# Patient Record
Sex: FEMALE | Race: ASIAN | NOT HISPANIC OR LATINO | URBAN - METROPOLITAN AREA
[De-identification: names, ages, dates, MRNs, and addresses within clinical notes are randomized per-mention and may not be internally consistent; named-entity substitution may affect disease eponyms.]

---

## 2017-09-22 ENCOUNTER — APPOINTMENT (RX ONLY)
Dept: URBAN - METROPOLITAN AREA CLINIC 368 | Facility: CLINIC | Age: 37
Setting detail: DERMATOLOGY
End: 2017-09-22

## 2017-09-22 DIAGNOSIS — D18.0 HEMANGIOMA: ICD-10-CM

## 2017-09-22 DIAGNOSIS — L81.4 OTHER MELANIN HYPERPIGMENTATION: ICD-10-CM

## 2017-09-22 DIAGNOSIS — L82.1 OTHER SEBORRHEIC KERATOSIS: ICD-10-CM

## 2017-09-22 DIAGNOSIS — D22 MELANOCYTIC NEVI: ICD-10-CM

## 2017-09-22 DIAGNOSIS — Z41.9 ENCOUNTER FOR PROCEDURE FOR PURPOSES OTHER THAN REMEDYING HEALTH STATE, UNSPECIFIED: ICD-10-CM

## 2017-09-22 DIAGNOSIS — Z71.89 OTHER SPECIFIED COUNSELING: ICD-10-CM

## 2017-09-22 PROBLEM — D18.01 HEMANGIOMA OF SKIN AND SUBCUTANEOUS TISSUE: Status: ACTIVE | Noted: 2017-09-22

## 2017-09-22 PROBLEM — L29.8 OTHER PRURITUS: Status: ACTIVE | Noted: 2017-09-22

## 2017-09-22 PROBLEM — J45.909 UNSPECIFIED ASTHMA, UNCOMPLICATED: Status: ACTIVE | Noted: 2017-09-22

## 2017-09-22 PROBLEM — E03.9 HYPOTHYROIDISM, UNSPECIFIED: Status: ACTIVE | Noted: 2017-09-22

## 2017-09-22 PROBLEM — G40.909 EPILEPSY, UNSPECIFIED, NOT INTRACTABLE, WITHOUT STATUS EPILEPTICUS: Status: ACTIVE | Noted: 2017-09-22

## 2017-09-22 PROBLEM — D22.5 MELANOCYTIC NEVI OF TRUNK: Status: ACTIVE | Noted: 2017-09-22

## 2017-09-22 PROCEDURE — 99214 OFFICE O/P EST MOD 30 MIN: CPT

## 2017-09-22 PROCEDURE — ? TREATMENT REGIMEN

## 2017-09-22 PROCEDURE — ? COUNSELING

## 2017-09-22 ASSESSMENT — LOCATION SIMPLE DESCRIPTION DERM
LOCATION SIMPLE: RIGHT CHEEK
LOCATION SIMPLE: ABDOMEN
LOCATION SIMPLE: LEFT CHEEK

## 2017-09-22 ASSESSMENT — LOCATION DETAILED DESCRIPTION DERM
LOCATION DETAILED: EPIGASTRIC SKIN
LOCATION DETAILED: LEFT CENTRAL MALAR CHEEK
LOCATION DETAILED: RIGHT INFERIOR CENTRAL MALAR CHEEK

## 2017-09-22 ASSESSMENT — LOCATION ZONE DERM
LOCATION ZONE: TRUNK
LOCATION ZONE: FACE

## 2017-09-22 NOTE — PROCEDURE: TREATMENT REGIMEN
Initiate Treatment: Wash face with GlySal2-2 cleanser every other day, work up twice a day as tolerated\\nPat skin dry\\Stacia bedtime, apply AlphaRet to entire face ( wait after finished with breastfeeding)
Detail Level: Simple
Notification: Please note that there are new Treatment Regimen plans which have an enhanced patient education handout experience.  The plans are called Treatment Regimen (Acne), Treatment Regimen (Atopic Dermatitis), Treatment Regimen (Rosacea), Treatment Regimen (Sun Protection), Treatment Regimen (Cosmetic Products), and Treatment Regimen (Xerosis).

## 2022-10-21 ENCOUNTER — APPOINTMENT (RX ONLY)
Dept: URBAN - METROPOLITAN AREA CLINIC 337 | Facility: CLINIC | Age: 42
Setting detail: DERMATOLOGY
End: 2022-10-21

## 2022-10-21 DIAGNOSIS — L738 OTHER SPECIFIED DISEASES OF HAIR AND HAIR FOLLICLES: ICD-10-CM | Status: INADEQUATELY CONTROLLED

## 2022-10-21 DIAGNOSIS — D22 MELANOCYTIC NEVI: ICD-10-CM

## 2022-10-21 DIAGNOSIS — L30.9 DERMATITIS, UNSPECIFIED: ICD-10-CM | Status: INADEQUATELY CONTROLLED

## 2022-10-21 DIAGNOSIS — L82.1 OTHER SEBORRHEIC KERATOSIS: ICD-10-CM

## 2022-10-21 DIAGNOSIS — D18.0 HEMANGIOMA: ICD-10-CM

## 2022-10-21 DIAGNOSIS — L73.9 FOLLICULAR DISORDER, UNSPECIFIED: ICD-10-CM | Status: INADEQUATELY CONTROLLED

## 2022-10-21 DIAGNOSIS — L81.4 OTHER MELANIN HYPERPIGMENTATION: ICD-10-CM

## 2022-10-21 DIAGNOSIS — L663 OTHER SPECIFIED DISEASES OF HAIR AND HAIR FOLLICLES: ICD-10-CM | Status: INADEQUATELY CONTROLLED

## 2022-10-21 PROBLEM — D23.61 OTHER BENIGN NEOPLASM OF SKIN OF RIGHT UPPER LIMB, INCLUDING SHOULDER: Status: ACTIVE | Noted: 2022-10-21

## 2022-10-21 PROBLEM — D18.01 HEMANGIOMA OF SKIN AND SUBCUTANEOUS TISSUE: Status: ACTIVE | Noted: 2022-10-21

## 2022-10-21 PROBLEM — L02.02 FURUNCLE OF FACE: Status: ACTIVE | Noted: 2022-10-21

## 2022-10-21 PROBLEM — D22.5 MELANOCYTIC NEVI OF TRUNK: Status: ACTIVE | Noted: 2022-10-21

## 2022-10-21 PROCEDURE — ? TREATMENT REGIMEN

## 2022-10-21 PROCEDURE — 99204 OFFICE O/P NEW MOD 45 MIN: CPT

## 2022-10-21 PROCEDURE — ? FULL BODY SKIN EXAM

## 2022-10-21 PROCEDURE — ? PRESCRIPTION

## 2022-10-21 PROCEDURE — ? COUNSELING

## 2022-10-21 PROCEDURE — ? PRESCRIPTION MEDICATION MANAGEMENT

## 2022-10-21 RX ORDER — TRIAMCINOLONE ACETONIDE 1 MG/G
CREAM TOPICAL BID
Qty: 80 | Refills: 2 | Status: ERX | COMMUNITY
Start: 2022-10-21

## 2022-10-21 RX ORDER — CLINDAMYCIN PHOSPHATE 10 MG/ML
LOTION TOPICAL QD
Qty: 60 | Refills: 3 | Status: ERX | COMMUNITY
Start: 2022-10-21

## 2022-10-21 RX ADMIN — TRIAMCINOLONE ACETONIDE: 1 CREAM TOPICAL at 00:00

## 2022-10-21 RX ADMIN — CLINDAMYCIN PHOSPHATE: 10 LOTION TOPICAL at 00:00

## 2022-10-21 ASSESSMENT — LOCATION SIMPLE DESCRIPTION DERM
LOCATION SIMPLE: LOWER BACK
LOCATION SIMPLE: UPPER BACK
LOCATION SIMPLE: LEFT UPPER BACK
LOCATION SIMPLE: CHIN

## 2022-10-21 ASSESSMENT — SEVERITY ASSESSMENT: SEVERITY: ALMOST CLEAR

## 2022-10-21 ASSESSMENT — LOCATION DETAILED DESCRIPTION DERM
LOCATION DETAILED: INFERIOR THORACIC SPINE
LOCATION DETAILED: RIGHT CHIN
LOCATION DETAILED: SUPERIOR LUMBAR SPINE
LOCATION DETAILED: SUPERIOR THORACIC SPINE
LOCATION DETAILED: LEFT INFERIOR UPPER BACK

## 2022-10-21 ASSESSMENT — PAIN INTENSITY VAS: HOW INTENSE IS YOUR PAIN 0 BEING NO PAIN, 10 BEING THE MOST SEVERE PAIN POSSIBLE?: NO PAIN

## 2022-10-21 ASSESSMENT — LOCATION ZONE DERM
LOCATION ZONE: TRUNK
LOCATION ZONE: FACE

## 2022-10-21 ASSESSMENT — ITCH NUMERIC RATING SCALE: HOW SEVERE IS YOUR ITCHING?: 2

## 2022-10-21 ASSESSMENT — BSA RASH: BSA RASH: 1

## 2022-10-21 NOTE — HPI: FULL BODY SKIN EXAMINATION
What Is The Reason For Today's Visit?: Full Body Skin Examination
What Is The Reason For Today's Visit? (Being Monitored For X): the development of a new lesion
Additional History: Pt here for FBSE with 2 itchy spots of concern. One spot on her right eyelid and one on her mid back. No personal or familial skin cancer history.

## 2022-10-21 NOTE — PROCEDURE: PRESCRIPTION MEDICATION MANAGEMENT
Render In Strict Bullet Format?: No
Initiate Treatment: Apply Clindamycin 1 % lotion to affected areas on chin once or twice daily
Detail Level: Zone
Initiate Treatment: Apply Triamcinolone 0.1 % cream twice daily to the affected areas on body (not face) x 4 weeks then apply as needed for [id number]\\napply moisturizer over top

## 2022-12-09 ENCOUNTER — APPOINTMENT (RX ONLY)
Dept: URBAN - METROPOLITAN AREA CLINIC 337 | Facility: CLINIC | Age: 42
Setting detail: DERMATOLOGY
End: 2022-12-09

## 2022-12-09 DIAGNOSIS — H01.13 ECZEMATOUS DERMATITIS OF EYELID: ICD-10-CM | Status: INADEQUATELY CONTROLLED

## 2022-12-09 PROBLEM — H01.133 ECZEMATOUS DERMATITIS OF RIGHT EYE, UNSPECIFIED EYELID: Status: ACTIVE | Noted: 2022-12-09

## 2022-12-09 PROCEDURE — ? COUNSELING

## 2022-12-09 PROCEDURE — ? PRESCRIPTION MEDICATION MANAGEMENT

## 2022-12-09 PROCEDURE — 99214 OFFICE O/P EST MOD 30 MIN: CPT

## 2022-12-09 PROCEDURE — ? PRESCRIPTION

## 2022-12-09 RX ORDER — DESONIDE 0.5 MG/G
CREAM TOPICAL
Qty: 60 | Refills: 2 | Status: ERX | COMMUNITY
Start: 2022-12-09

## 2022-12-09 RX ADMIN — DESONIDE: 0.5 CREAM TOPICAL at 00:00

## 2022-12-09 ASSESSMENT — SEVERITY ASSESSMENT: SEVERITY: MILD

## 2022-12-09 ASSESSMENT — LOCATION ZONE DERM: LOCATION ZONE: EYELID

## 2022-12-09 ASSESSMENT — LOCATION DETAILED DESCRIPTION DERM: LOCATION DETAILED: RIGHT MEDIAL CANTHUS

## 2022-12-09 ASSESSMENT — LOCATION SIMPLE DESCRIPTION DERM: LOCATION SIMPLE: RIGHT EYELID

## 2022-12-09 ASSESSMENT — PAIN INTENSITY VAS: HOW INTENSE IS YOUR PAIN 0 BEING NO PAIN, 10 BEING THE MOST SEVERE PAIN POSSIBLE?: NO PAIN

## 2022-12-09 NOTE — PROCEDURE: PRESCRIPTION MEDICATION MANAGEMENT
Initiate Treatment: Apply Democide to affected area twice daily x 4 weeks\\nApply Vaseline over top
Render In Strict Bullet Format?: No
Detail Level: Zone

## 2023-03-02 ENCOUNTER — APPOINTMENT (RX ONLY)
Dept: URBAN - METROPOLITAN AREA CLINIC 337 | Facility: CLINIC | Age: 43
Setting detail: DERMATOLOGY
End: 2023-03-02

## 2023-03-02 DIAGNOSIS — D17 BENIGN LIPOMATOUS NEOPLASM: ICD-10-CM

## 2023-03-02 DIAGNOSIS — H01.13 ECZEMATOUS DERMATITIS OF EYELID: ICD-10-CM | Status: INADEQUATELY CONTROLLED

## 2023-03-02 PROBLEM — H01.131 ECZEMATOUS DERMATITIS OF RIGHT UPPER EYELID: Status: ACTIVE | Noted: 2023-03-02

## 2023-03-02 PROBLEM — H01.134 ECZEMATOUS DERMATITIS OF LEFT UPPER EYELID: Status: ACTIVE | Noted: 2023-03-02

## 2023-03-02 PROBLEM — D48.5 NEOPLASM OF UNCERTAIN BEHAVIOR OF SKIN: Status: ACTIVE | Noted: 2023-03-02

## 2023-03-02 PROCEDURE — ? ORDER ULTRASOUND

## 2023-03-02 PROCEDURE — ? PRESCRIPTION

## 2023-03-02 PROCEDURE — 99214 OFFICE O/P EST MOD 30 MIN: CPT

## 2023-03-02 PROCEDURE — ? PRESCRIPTION MEDICATION MANAGEMENT

## 2023-03-02 PROCEDURE — ? COUNSELING

## 2023-03-02 RX ORDER — NA MG FL/NA PHO/NACL/HA/NA HYP
GEL (GRAM) TOPICAL
Qty: 40 | Refills: 2 | Status: ERX | COMMUNITY
Start: 2023-03-02

## 2023-03-02 RX ORDER — PIMECROLIMUS 10 MG/G
CREAM TOPICAL
Qty: 30 | Refills: 2 | Status: ERX | COMMUNITY
Start: 2023-03-02

## 2023-03-02 RX ADMIN — Medication: at 00:00

## 2023-03-02 RX ADMIN — PIMECROLIMUS: 10 CREAM TOPICAL at 00:00

## 2023-03-02 ASSESSMENT — LOCATION DETAILED DESCRIPTION DERM
LOCATION DETAILED: LEFT MEDIAL BREAST 7-8:00 REGION
LOCATION DETAILED: RIGHT MEDIAL SUPERIOR EYELID
LOCATION DETAILED: LEFT MEDIAL SUPERIOR EYELID

## 2023-03-02 ASSESSMENT — LOCATION SIMPLE DESCRIPTION DERM
LOCATION SIMPLE: LEFT BREAST
LOCATION SIMPLE: RIGHT SUPERIOR EYELID
LOCATION SIMPLE: LEFT SUPERIOR EYELID

## 2023-03-02 ASSESSMENT — LOCATION ZONE DERM
LOCATION ZONE: TRUNK
LOCATION ZONE: EYELID

## 2023-03-02 NOTE — PROCEDURE: ORDER ULTRASOUND
Priority: normal
Which Breast: Left Breast
Ultrasound Protocol: Ultrasound of Breast
Detail Level: Zone
Subcutaneous Lesion Ultrasound Reason: subcutaneous mobile nodule r/o ossification vs calcification vs other
Provider: Marvel Yanez PA-C
Breast Ultrasound Reason: r/o cyst on left breast 7:00/8:00 region
Time Frame Unit: day(s)

## 2023-03-02 NOTE — PROCEDURE: PRESCRIPTION MEDICATION MANAGEMENT
Initiate Treatment: .\\n1) Start Acuicyn eyelid wash twice daily x 4 weeks\\n2) Apply pimecrolimus to eyelids twice daily x 4 weeks
Render In Strict Bullet Format?: No
Detail Level: Zone
Discontinue Regimen: .\\nDesonide

## 2023-04-13 ENCOUNTER — APPOINTMENT (RX ONLY)
Dept: URBAN - METROPOLITAN AREA CLINIC 337 | Facility: CLINIC | Age: 43
Setting detail: DERMATOLOGY
End: 2023-04-13

## 2023-04-13 DIAGNOSIS — H01.13 ECZEMATOUS DERMATITIS OF EYELID: ICD-10-CM

## 2023-04-13 DIAGNOSIS — L30.9 DERMATITIS, UNSPECIFIED: ICD-10-CM | Status: INADEQUATELY CONTROLLED

## 2023-04-13 PROBLEM — H01.131 ECZEMATOUS DERMATITIS OF RIGHT UPPER EYELID: Status: ACTIVE | Noted: 2023-04-13

## 2023-04-13 PROBLEM — H01.134 ECZEMATOUS DERMATITIS OF LEFT UPPER EYELID: Status: ACTIVE | Noted: 2023-04-13

## 2023-04-13 PROCEDURE — ? PRESCRIPTION MEDICATION MANAGEMENT

## 2023-04-13 PROCEDURE — ? COUNSELING

## 2023-04-13 PROCEDURE — ? PRESCRIPTION

## 2023-04-13 PROCEDURE — 99213 OFFICE O/P EST LOW 20 MIN: CPT

## 2023-04-13 RX ORDER — DEXAMETHASONE 1 MG/ML
SUSPENSION/ DROPS OPHTHALMIC
Qty: 5 | Refills: 1 | Status: ERX | COMMUNITY
Start: 2023-04-13

## 2023-04-13 RX ADMIN — DEXAMETHASONE: 1 SUSPENSION/ DROPS OPHTHALMIC at 00:00

## 2023-04-13 ASSESSMENT — LOCATION DETAILED DESCRIPTION DERM
LOCATION DETAILED: RIGHT MEDIAL SUPERIOR EYELID
LOCATION DETAILED: HAIR
LOCATION DETAILED: LEFT MEDIAL SUPERIOR EYELID

## 2023-04-13 ASSESSMENT — LOCATION ZONE DERM
LOCATION ZONE: SCALP
LOCATION ZONE: EYELID

## 2023-04-13 ASSESSMENT — LOCATION SIMPLE DESCRIPTION DERM
LOCATION SIMPLE: HAIR
LOCATION SIMPLE: LEFT SUPERIOR EYELID
LOCATION SIMPLE: RIGHT SUPERIOR EYELID

## 2023-04-13 NOTE — PROCEDURE: PRESCRIPTION MEDICATION MANAGEMENT
Continue Regimen: .\\nAcuicyn eyelid wash twice daily
Initiate Treatment: .\\nApply dexamethasone to eyelids twice daily x 2 weeks
Render In Strict Bullet Format?: No
Detail Level: Zone
Discontinue Regimen: .\\nDesonide\\nPimecrolimus
Initiate Treatment: .\\nApply Elidel twice daily to affected areas (Pt has rx at home)

## 2024-12-16 ENCOUNTER — RX ONLY (RX ONLY)
Age: 44
End: 2024-12-16

## 2024-12-16 ENCOUNTER — APPOINTMENT (OUTPATIENT)
Dept: URBAN - METROPOLITAN AREA CLINIC 337 | Facility: CLINIC | Age: 44
Setting detail: DERMATOLOGY
End: 2024-12-16

## 2024-12-16 DIAGNOSIS — L21.8 OTHER SEBORRHEIC DERMATITIS: ICD-10-CM | Status: INADEQUATELY CONTROLLED

## 2024-12-16 DIAGNOSIS — D18.0 HEMANGIOMA: ICD-10-CM

## 2024-12-16 DIAGNOSIS — H01.13 ECZEMATOUS DERMATITIS OF EYELID: ICD-10-CM | Status: INADEQUATELY CONTROLLED

## 2024-12-16 DIAGNOSIS — D22 MELANOCYTIC NEVI: ICD-10-CM

## 2024-12-16 DIAGNOSIS — L81.4 OTHER MELANIN HYPERPIGMENTATION: ICD-10-CM

## 2024-12-16 DIAGNOSIS — L82.1 OTHER SEBORRHEIC KERATOSIS: ICD-10-CM

## 2024-12-16 PROBLEM — H01.134 ECZEMATOUS DERMATITIS OF LEFT UPPER EYELID: Status: ACTIVE | Noted: 2024-12-16

## 2024-12-16 PROBLEM — H01.131 ECZEMATOUS DERMATITIS OF RIGHT UPPER EYELID: Status: ACTIVE | Noted: 2024-12-16

## 2024-12-16 PROBLEM — D22.5 MELANOCYTIC NEVI OF TRUNK: Status: ACTIVE | Noted: 2024-12-16

## 2024-12-16 PROBLEM — D18.01 HEMANGIOMA OF SKIN AND SUBCUTANEOUS TISSUE: Status: ACTIVE | Noted: 2024-12-16

## 2024-12-16 PROCEDURE — ? COUNSELING

## 2024-12-16 PROCEDURE — ? PRESCRIPTION

## 2024-12-16 PROCEDURE — ? FULL BODY SKIN EXAM

## 2024-12-16 PROCEDURE — ? PRESCRIPTION MEDICATION MANAGEMENT

## 2024-12-16 PROCEDURE — 99213 OFFICE O/P EST LOW 20 MIN: CPT

## 2024-12-16 PROCEDURE — ? TREATMENT REGIMEN

## 2024-12-16 RX ORDER — FLUOCINOLONE ACETONIDE 0.1 MG/ML
SOLUTION TOPICAL
Qty: 60 | Refills: 2 | Status: ERX | COMMUNITY
Start: 2024-12-16

## 2024-12-16 RX ORDER — DEXAMETHASONE 1 MG/ML
SUSPENSION/ DROPS OPHTHALMIC
Qty: 5 | Refills: 1 | Status: ERX | COMMUNITY
Start: 2024-12-16

## 2024-12-16 RX ORDER — DEXAMETHASONE 1 MG/ML
SUSPENSION/ DROPS OPHTHALMIC
Qty: 5 | Refills: 1 | Status: CANCELLED
Stop reason: SDUPTHER

## 2024-12-16 RX ORDER — KETOCONAZOLE 20 MG/ML
SHAMPOO, SUSPENSION TOPICAL
Qty: 120 | Refills: 3 | Status: ERX | COMMUNITY
Start: 2024-12-16

## 2024-12-16 RX ADMIN — KETOCONAZOLE: 20 SHAMPOO, SUSPENSION TOPICAL at 00:00

## 2024-12-16 RX ADMIN — DEXAMETHASONE: 1 SUSPENSION/ DROPS OPHTHALMIC at 00:00

## 2024-12-16 RX ADMIN — FLUOCINOLONE ACETONIDE: 0.1 SOLUTION TOPICAL at 00:00

## 2024-12-16 ASSESSMENT — LOCATION ZONE DERM
LOCATION ZONE: EYELID
LOCATION ZONE: SCALP
LOCATION ZONE: TRUNK

## 2024-12-16 ASSESSMENT — LOCATION DETAILED DESCRIPTION DERM
LOCATION DETAILED: MID-FRONTAL SCALP
LOCATION DETAILED: SUPERIOR THORACIC SPINE
LOCATION DETAILED: RIGHT MEDIAL SUPERIOR EYELID
LOCATION DETAILED: INFERIOR THORACIC SPINE
LOCATION DETAILED: SUPERIOR LUMBAR SPINE
LOCATION DETAILED: LEFT MEDIAL SUPERIOR EYELID

## 2024-12-16 ASSESSMENT — LOCATION SIMPLE DESCRIPTION DERM
LOCATION SIMPLE: ANTERIOR SCALP
LOCATION SIMPLE: LEFT SUPERIOR EYELID
LOCATION SIMPLE: UPPER BACK
LOCATION SIMPLE: RIGHT SUPERIOR EYELID
LOCATION SIMPLE: LOWER BACK

## 2024-12-16 NOTE — PROCEDURE: PRESCRIPTION MEDICATION MANAGEMENT
Initiate Treatment: Apply small amount Fluocinolone solution nightly to affected areas of scalp for up to 2 weeks. After 2 weeks use only as needed for scalp itching not longer than 2 weeks at a time\\n\\nWash with ketoconazole shampoo 3x weekly. Lather on to scalp and let sit 5 minutes before rinsing. Okay to follow with your regular shampoo
Detail Level: Zone
Render In Strict Bullet Format?: No
Continue Regimen: .\\nFor flares: \\n\\n.\\nApply dexamethasone to eyelids twice daily x 2 weeks\\n\\nAcuicyn eyelid wash twice daily